# Patient Record
Sex: MALE | Race: WHITE | URBAN - METROPOLITAN AREA
[De-identification: names, ages, dates, MRNs, and addresses within clinical notes are randomized per-mention and may not be internally consistent; named-entity substitution may affect disease eponyms.]

---

## 2024-06-06 ENCOUNTER — OFFICE VISIT (OUTPATIENT)
Dept: ONCOLOGY | Age: 64
End: 2024-06-06
Payer: COMMERCIAL

## 2024-06-06 ENCOUNTER — HOSPITAL ENCOUNTER (OUTPATIENT)
Dept: INFUSION THERAPY | Age: 64
Discharge: HOME | End: 2024-06-06
Payer: COMMERCIAL

## 2024-06-06 VITALS — BODY MASS INDEX: 23.65 KG/M2 | WEIGHT: 190.2 LBS | HEIGHT: 75 IN

## 2024-06-06 DIAGNOSIS — Z71.83 ENCOUNTER FOR NONPROCREATIVE GENETIC COUNSELING: Primary | ICD-10-CM

## 2024-06-06 PROCEDURE — 99205 OFFICE O/P NEW HI 60 MIN: CPT | Performed by: NURSE PRACTITIONER

## 2024-06-06 PROCEDURE — 3017F COLORECTAL CA SCREEN DOC REV: CPT | Performed by: NURSE PRACTITIONER

## 2024-06-06 PROCEDURE — 4004F PT TOBACCO SCREEN RCVD TLK: CPT | Performed by: NURSE PRACTITIONER

## 2024-06-06 PROCEDURE — G8420 CALC BMI NORM PARAMETERS: HCPCS | Performed by: NURSE PRACTITIONER

## 2024-06-06 PROCEDURE — G8428 CUR MEDS NOT DOCUMENT: HCPCS | Performed by: NURSE PRACTITIONER

## 2024-06-06 PROCEDURE — 99211 OFF/OP EST MAY X REQ PHY/QHP: CPT

## 2024-06-06 NOTE — PROGRESS NOTES
MA Rooming Questions    Patient: Terry Ochoa  MRN: [id number]    Date: 6/6/2024        Genetic Counseling               Kelly Mckeon [facility]

## 2024-06-06 NOTE — PROGRESS NOTES
Hereditary Cancer Risk Assessment     Name: Terry Ochoa   YOB: 1960   Date of Consultation: 24     Mr. Ochoa was seen at the [facility] for genetic counseling on 24.  Mr. Ochoa was referred by Max Cintron PA-C due to his family history.     PERSONAL HISTORY   Mr. Ochoa is a 64 y.o.  male with no personal history of cancer. He reports:     No previous genetic counseling  No known familial mutation  Not adopted, Nor a twin  First Colonoscopy - first at 59, next due 69  Number of total polyps- no  Has had EGD - none  Smokes 18-23 smoker, not even a pack a day  Etoh- occasional      FAMILY HISTORY    Mother: , 70, head and neck v lymphoma v metastatic disease; s/p \"neck surgery surgery\"  Maternal Grandmother:92, , unaffected  Maternal Grandfather: , unaffected, 90's  Father: , MI 50's, unaffected  Paternal Grandfather: , ALZ, 90's  Paternal Grandmother: , unaffected, ALZ,  94  Daughter: 23, unaffected, living  Son: 34, unaffected, living  Sister: , MVA, 45:  Maternal Aunt:  , lung cancer, 60's  Maternal Aunt: , ?? Cancer, 71  Maternal Aunt: breast cancer, mastectomy, living 85, unsure age of diagnosis  Maternal Aunt: , MVA 30's  Maternal Uncle:colon cancer, dx 50's,  at 70's  Maternal Uncle: , not cancer 60's, unaffected  Maternal Uncle: living, 90's, unaffected  Paternal Uncle:ALZ,   Paternal Uncle: living, unaffected, 88  Maternal cousin, breast cancer, dx at 59      Mr. Ochoa reports  ancestry and denies any known Ashkenazi Caodaism heritage.     RISK ASSESSMENT   We discussed that approximately 5-10% of cancers are due to a hereditary gene mutation which causes an increased risk for certain cancers. Hereditary cancers are typically diagnosed at younger ages (under age 50y) and occur in multiple generations of a family. Multiple individuals with

## 2024-07-22 ENCOUNTER — TELEPHONE (OUTPATIENT)
Dept: ONCOLOGY | Age: 64
End: 2024-07-22

## 2024-07-24 ENCOUNTER — TELEPHONE (OUTPATIENT)
Dept: ONCOLOGY | Age: 64
End: 2024-07-24

## 2024-07-24 ENCOUNTER — CLINICAL DOCUMENTATION (OUTPATIENT)
Dept: ONCOLOGY | Age: 64
End: 2024-07-24

## 2024-07-24 DIAGNOSIS — Z71.83 ENCOUNTER FOR NONPROCREATIVE GENETIC COUNSELING: Primary | ICD-10-CM

## 2024-07-24 NOTE — TELEPHONE ENCOUNTER
Second attempt to reach patient re: genetic testing results. Left message with direct call back number.

## 2024-07-24 NOTE — PROGRESS NOTES
[facility] Genetic Counseling Program   Hereditary Cancer Risk Assessment     Name: Terry Ochoa  YOB: 1960  Date of Results Disclosure: 7/24/2024       HISTORY   Mr. Ochoa was seen for genetic counseling at the request of Max Cintron PA-C   due to his family history of cancer.  At that time, Mr. Ochoa chose to pursue genetic testing via the [employer] hereditary cancer gene panel. These results were discussed with Mr. Ochoa via telephone. A summary of Mr. Ochoa's results and recommendations are below.     RESULTS      We discussed that Mr. Watsons negative test result greatly reduces the likelihood that his personal history of cancer is due to a hereditary mutation.  It is possible that his personal history of cancer may be due to a gene for which testing was not performed or which has yet to be discovered.     We discussed that Mr. Watsons negative test result greatly reduces the likelihood that he carries a hereditary gene mutation. However, it is possible that his family history of cancer is due to a hereditary mutation which he did not inherit.  It is also possible that his family history of cancer may be due to a gene for which testing was not performed or which has yet to be discovered.      RECOMMENDATIONS FOR FAMILY MEMBERS   1) Genetic testing is not recommended for Mr. Ochoa's children based on his negative test results. However, this recommendation does not take into consideration any family history of cancer in their maternal family.     2) It is possible that the cancers in Mr. Watsons family are due to a hereditary gene mutation that he did not inherit. Therefore, his relatives (particularly those with a current or previous cancer diagnosis) may consider genetic counseling and testing to clarify this possibility. Relatives may contact the [facility] at [phone] or locate a genetic counselor at [email or web address]     3) We encourage